# Patient Record
Sex: MALE | Race: OTHER | HISPANIC OR LATINO | URBAN - METROPOLITAN AREA
[De-identification: names, ages, dates, MRNs, and addresses within clinical notes are randomized per-mention and may not be internally consistent; named-entity substitution may affect disease eponyms.]

---
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
## 2023-09-01 NOTE — PEDIATRIC PRE-OP CHECKLIST ([facility] ONLY) - BOWEL PREP
n/a Principal Discharge DX:	Suprapubic catheter dysfunction, initial encounter  Secondary Diagnosis:	UTI (urinary tract infection) due to urinary indwelling catheter   1